# Patient Record
Sex: FEMALE | Race: WHITE | NOT HISPANIC OR LATINO | Employment: PART TIME | URBAN - METROPOLITAN AREA
[De-identification: names, ages, dates, MRNs, and addresses within clinical notes are randomized per-mention and may not be internally consistent; named-entity substitution may affect disease eponyms.]

---

## 2017-01-03 ENCOUNTER — APPOINTMENT (OUTPATIENT)
Dept: URGENT CARE | Age: 57
End: 2017-01-03
Payer: OTHER MISCELLANEOUS

## 2017-01-03 PROCEDURE — 99283 EMERGENCY DEPT VISIT LOW MDM: CPT | Performed by: FAMILY MEDICINE

## 2017-01-03 PROCEDURE — G0382 LEV 3 HOSP TYPE B ED VISIT: HCPCS | Performed by: FAMILY MEDICINE

## 2017-01-04 ENCOUNTER — OFFICE VISIT (OUTPATIENT)
Dept: PHYSICAL THERAPY | Age: 57
End: 2017-01-04
Payer: OTHER MISCELLANEOUS

## 2017-01-04 PROCEDURE — 97162 PT EVAL MOD COMPLEX 30 MIN: CPT

## 2017-01-06 ENCOUNTER — APPOINTMENT (OUTPATIENT)
Dept: URGENT CARE | Age: 57
End: 2017-01-06
Payer: OTHER MISCELLANEOUS

## 2017-01-06 PROCEDURE — 99213 OFFICE O/P EST LOW 20 MIN: CPT | Performed by: FAMILY MEDICINE

## 2017-01-09 ENCOUNTER — APPOINTMENT (OUTPATIENT)
Dept: PHYSICAL THERAPY | Age: 57
End: 2017-01-09
Payer: OTHER MISCELLANEOUS

## 2017-01-09 PROCEDURE — 97113 AQUATIC THERAPY/EXERCISES: CPT

## 2017-01-10 ENCOUNTER — APPOINTMENT (OUTPATIENT)
Dept: URGENT CARE | Age: 57
End: 2017-01-10
Payer: OTHER MISCELLANEOUS

## 2017-01-10 PROCEDURE — 99213 OFFICE O/P EST LOW 20 MIN: CPT | Performed by: FAMILY MEDICINE

## 2017-01-11 ENCOUNTER — APPOINTMENT (OUTPATIENT)
Dept: PHYSICAL THERAPY | Age: 57
End: 2017-01-11
Payer: OTHER MISCELLANEOUS

## 2017-01-11 PROCEDURE — 97113 AQUATIC THERAPY/EXERCISES: CPT

## 2017-01-16 ENCOUNTER — APPOINTMENT (OUTPATIENT)
Dept: PHYSICAL THERAPY | Age: 57
End: 2017-01-16
Payer: OTHER MISCELLANEOUS

## 2017-01-16 PROCEDURE — 97113 AQUATIC THERAPY/EXERCISES: CPT

## 2017-01-18 ENCOUNTER — APPOINTMENT (OUTPATIENT)
Dept: PHYSICAL THERAPY | Age: 57
End: 2017-01-18
Payer: OTHER MISCELLANEOUS

## 2017-01-18 PROCEDURE — 97113 AQUATIC THERAPY/EXERCISES: CPT

## 2017-01-19 ENCOUNTER — OFFICE VISIT (OUTPATIENT)
Dept: URGENT CARE | Age: 57
End: 2017-01-19
Payer: COMMERCIAL

## 2017-01-19 PROCEDURE — 99213 OFFICE O/P EST LOW 20 MIN: CPT

## 2017-01-23 ENCOUNTER — APPOINTMENT (OUTPATIENT)
Dept: PHYSICAL THERAPY | Age: 57
End: 2017-01-23
Payer: OTHER MISCELLANEOUS

## 2017-01-25 ENCOUNTER — APPOINTMENT (OUTPATIENT)
Dept: PHYSICAL THERAPY | Age: 57
End: 2017-01-25
Payer: OTHER MISCELLANEOUS

## 2017-01-30 ENCOUNTER — APPOINTMENT (OUTPATIENT)
Dept: PHYSICAL THERAPY | Age: 57
End: 2017-01-30
Payer: OTHER MISCELLANEOUS

## 2017-06-27 ENCOUNTER — APPOINTMENT (OUTPATIENT)
Dept: URGENT CARE | Age: 57
End: 2017-06-27
Payer: OTHER MISCELLANEOUS

## 2017-06-27 ENCOUNTER — APPOINTMENT (OUTPATIENT)
Dept: RADIOLOGY | Age: 57
End: 2017-06-27
Attending: PREVENTIVE MEDICINE
Payer: OTHER MISCELLANEOUS

## 2017-06-27 ENCOUNTER — TRANSCRIBE ORDERS (OUTPATIENT)
Dept: URGENT CARE | Age: 57
End: 2017-06-27

## 2017-06-27 DIAGNOSIS — T14.90XA INJURY: Primary | ICD-10-CM

## 2017-06-27 DIAGNOSIS — T14.90XA INJURY: ICD-10-CM

## 2017-06-27 PROCEDURE — 99283 EMERGENCY DEPT VISIT LOW MDM: CPT | Performed by: PREVENTIVE MEDICINE

## 2017-06-27 PROCEDURE — G0382 LEV 3 HOSP TYPE B ED VISIT: HCPCS | Performed by: PREVENTIVE MEDICINE

## 2017-06-27 PROCEDURE — 73564 X-RAY EXAM KNEE 4 OR MORE: CPT

## 2020-12-28 ENCOUNTER — OFFICE VISIT (OUTPATIENT)
Dept: URGENT CARE | Age: 60
End: 2020-12-28
Payer: COMMERCIAL

## 2020-12-28 VITALS
HEIGHT: 65 IN | BODY MASS INDEX: 40.82 KG/M2 | WEIGHT: 245 LBS | OXYGEN SATURATION: 95 % | TEMPERATURE: 97.2 F | RESPIRATION RATE: 18 BRPM | HEART RATE: 98 BPM

## 2020-12-28 DIAGNOSIS — R43.2 LOSS OF TASTE: Primary | ICD-10-CM

## 2020-12-28 PROCEDURE — G0382 LEV 3 HOSP TYPE B ED VISIT: HCPCS | Performed by: PHYSICIAN ASSISTANT

## 2020-12-28 PROCEDURE — S9083 URGENT CARE CENTER GLOBAL: HCPCS | Performed by: PHYSICIAN ASSISTANT

## 2020-12-28 PROCEDURE — U0003 INFECTIOUS AGENT DETECTION BY NUCLEIC ACID (DNA OR RNA); SEVERE ACUTE RESPIRATORY SYNDROME CORONAVIRUS 2 (SARS-COV-2) (CORONAVIRUS DISEASE [COVID-19]), AMPLIFIED PROBE TECHNIQUE, MAKING USE OF HIGH THROUGHPUT TECHNOLOGIES AS DESCRIBED BY CMS-2020-01-R: HCPCS | Performed by: PHYSICIAN ASSISTANT

## 2020-12-28 RX ORDER — AMLODIPINE BESYLATE 5 MG/1
TABLET ORAL
COMMUNITY
Start: 2020-12-04

## 2020-12-28 RX ORDER — TELMISARTAN 80 MG/1
80 TABLET ORAL DAILY
COMMUNITY
Start: 2020-10-26

## 2020-12-30 LAB — SARS-COV-2 RNA SPEC QL NAA+PROBE: NOT DETECTED

## 2021-03-06 ENCOUNTER — OFFICE VISIT (OUTPATIENT)
Dept: URGENT CARE | Age: 61
End: 2021-03-06
Payer: COMMERCIAL

## 2021-03-06 VITALS
TEMPERATURE: 98.6 F | WEIGHT: 240 LBS | DIASTOLIC BLOOD PRESSURE: 86 MMHG | SYSTOLIC BLOOD PRESSURE: 136 MMHG | BODY MASS INDEX: 39.99 KG/M2 | RESPIRATION RATE: 18 BRPM | OXYGEN SATURATION: 99 % | HEART RATE: 72 BPM | HEIGHT: 65 IN

## 2021-03-06 DIAGNOSIS — R21 RASH: Primary | ICD-10-CM

## 2021-03-06 PROCEDURE — 99213 OFFICE O/P EST LOW 20 MIN: CPT | Performed by: PHYSICIAN ASSISTANT

## 2021-03-06 RX ORDER — FAMOTIDINE 10 MG
10 TABLET ORAL 2 TIMES DAILY
COMMUNITY
Start: 2020-12-30 | End: 2021-12-30

## 2021-03-06 RX ORDER — PREDNISONE 20 MG/1
40 TABLET ORAL DAILY
Qty: 10 TABLET | Refills: 0 | Status: SHIPPED | OUTPATIENT
Start: 2021-03-06 | End: 2021-03-11

## 2021-03-06 NOTE — PATIENT INSTRUCTIONS
take prednisone as directed until completed   continue use Benadryl as needed for itching  Follow up with PCP in 3-5 days  Proceed to  ER if symptoms worsen  Acute Rash   AMBULATORY CARE:   A rash  is irritated, red, or itchy skin or mucus membranes, such as the lining of your nose or throat  Acute means the rash starts suddenly, worsens quickly, and lasts a short time  Common causes include a disease or infection, a reaction to something you are allergic to, or certain medicines  Seek care immediately if:   · You have sudden trouble breathing or chest pain  · You are vomiting, have a headache or muscle aches, and your throat hurts  Call your doctor or dermatologist if:   · You have a fever  · You get open wounds from scratching your skin, or you have a wound that is red, swollen, or painful  · Your rash lasts longer than 3 months  · You have swelling or pain in your joints  · You have questions or concerns about your condition or care  Common types of rashes:   · Eczema  causes inflamed, itchy areas  Your skin may be dry, scaly, and thick  The outer layer may be damaged  Irritants, stress, or a family history of eczema make you more likely to get it  · Contact dermatitis  causes a small, itchy growth that may be flat or raised  It appears after you touch something that damages your skin or causes an allergic reaction  Examples include chemicals, metals, dye, soaps or detergents, and latex  · Atopic dermatitis  causes small, itchy, blister-like growths along skin lines and folds  The growths may ooze fluid and become scaly, crusted, or hard  You may have sore, dry skin or swollen eyes  This rash usually forms after you are around an allergen, are overheated, or wear rough clothing  · Urticaria (hives)  appears suddenly as patches and raised areas of swollen skin or mucus membrane  The area may itch or burn   Common causes include allergens, latex, certain foods, a bee sting, smoke, or a blood transfusion  · Pityriasis rosea  may appear before you get a disease caused by bacteria or a virus  The rash may look like a patch on your chest, back, or abdomen  The rash may spread to become small, red, cone-shaped bumps that usually grow in groups  Treatment  will depend on the condition causing your acute rash  You may need any of the following:  · Medicines  may be used to decrease itching or inflammation, or prevent or treat a bacterial infection  Medicines may also help your immune system fight infection or stop it from attacking your skin  · Ultraviolet phototherapy  means the rash is put under light  Light therapy helps treats atopic dermatitis or eczema that does not get better with steroids  It can help pityriasis rosea heal faster and decrease itching  Prevent a rash or care for your skin when you have a rash:  Dry skin can lead to more problems  Do not scratch your skin if it itches  You may cause a skin infection by scratching  The following may prevent dry skin, and help your skin look better:  · Help soothe your rash  Apply thick cream lotions or petroleum jelly  Cool compresses may also soothe your skin  Apply a cool compress or a cool, wet towel, and then cover it with a dry towel  · Use lukewarm water when you bathe  Hot water may damage your skin more  Pat your skin dry  Do not rub your skin with a towel  · Use detergents, soaps, shampoos, and bubble baths  made for sensitive skin  · Wear clothes made of cotton instead of nylon or wool  Cotton is softer, so it will not hurt your skin as much  Follow up with your healthcare providers as directed:  A dermatologist may help find the cause of your rash or help plan or change treatment  A dietitian may help with meal planning if you have a food allergy  Write down your questions so you remember to ask them during your visits    © Copyright [employer] Information is for End User's use only and may not be sold, redistributed or otherwise used for commercial purposes  All illustrations and images included in CareNotes® are the copyrighted property of [employer]  or [employer]  The above information is an  only  It is not intended as medical advice for individual conditions or treatments  Talk to your doctor, nurse or pharmacist before following any medical regimen to see if it is safe and effective for you

## 2021-03-06 NOTE — PROGRESS NOTES
[facility]        NAME: Felicia Jones is a 62 y o  female  : 1960    MRN: [id number]  DATE: 2021  TIME: 5:47 PM    Assessment and Plan   Rash [R21]  1  Rash  predniSONE 20 mg tablet         Patient Instructions      take prednisone as directed until completed   continue use Benadryl as needed for itching  Follow up with PCP in 3-5 days  Proceed to  ER if symptoms worsen  Chief Complaint     Chief Complaint   Patient presents with    Rash     Patient c/o a rash all over her body that started on 2021  History of Present Illness        45-year-old female presents with rash  Patient reports it started on 2021  Has been waxing waning  Reports that is very itchy  No new soaps lotions or medications  Denies any shortness of breath chest pain or wheezing  No swelling of lips tongue or throat or mouth  Has been using some Benadryl with minimal relief    Rash  This is a new problem  The current episode started 1 to 4 weeks ago  The problem has been waxing and waning since onset  The rash is diffuse  The rash is characterized by itchiness, redness and swelling  She was exposed to nothing  Pertinent negatives include no cough, fatigue, fever, rhinorrhea, shortness of breath, sore throat or vomiting  Past treatments include antihistamine  The treatment provided mild relief  Review of Systems   Review of Systems   Constitutional: Negative  Negative for fatigue and fever  HENT: Negative  Negative for rhinorrhea and sore throat  Eyes: Negative  Respiratory: Negative  Negative for cough and shortness of breath  Cardiovascular: Negative  Gastrointestinal: Negative  Negative for vomiting  Musculoskeletal: Negative  Skin: Positive for rash  Neurological: Negative            Current Medications       Current Outpatient Medications:     famotidine (PEPCID) 10 mg tablet, Take 10 mg by mouth 2 (two) times a day, Disp: , Rfl:     amLODIPine (NORVASC) 5 mg tablet, , Disp: , Rfl:     predniSONE 20 mg tablet, Take 2 tablets (40 mg total) by mouth daily for 5 days, Disp: 10 tablet, Rfl: 0    telmisartan (MICARDIS) 80 MG tablet, Take 80 mg by mouth daily, Disp: , Rfl:     Current Allergies     Allergies as of 03/06/2021    (No Known Allergies)            The following portions of the patient's history were reviewed and updated as appropriate: allergies, current medications, past family history, past medical history, past social history, past surgical history and problem list      Past Medical History:   Diagnosis Date    Hypertension        Past Surgical History:   Procedure Laterality Date    DENTAL SURGERY  11/27/2020       History reviewed  No pertinent family history  Medications have been verified  Objective   /86   Pulse 72   Temp 98 6 °F (37 °C)   Resp 18   Ht 5' 5" (1 651 m)   Wt 109 kg (240 lb)   SpO2 99%   BMI 39 94 kg/m²   No LMP recorded  Patient is postmenopausal        Physical Exam     Physical Exam  Vitals signs and nursing note reviewed  Constitutional:       General: She is not in acute distress  Appearance: She is well-developed  HENT:      Head: Normocephalic and atraumatic  Right Ear: Hearing, tympanic membrane, ear canal and external ear normal       Left Ear: Hearing, tympanic membrane, ear canal and external ear normal       Nose: Nose normal       Mouth/Throat:      Pharynx: Uvula midline  No oropharyngeal exudate  Eyes:      General:         Right eye: No discharge  Left eye: No discharge  Conjunctiva/sclera: Conjunctivae normal    Neck:      Musculoskeletal: Normal range of motion and neck supple  Cardiovascular:      Rate and Rhythm: Normal rate and regular rhythm  Heart sounds: Normal heart sounds  No murmur  Pulmonary:      Effort: Pulmonary effort is normal  No respiratory distress  Breath sounds: Normal breath sounds  No wheezing or rales     Abdominal:      General: Bowel sounds are normal       Palpations: Abdomen is soft  Tenderness: There is no abdominal tenderness  Musculoskeletal: Normal range of motion  Lymphadenopathy:      Cervical: No cervical adenopathy  Skin:     General: Skin is warm and dry  Findings: Rash ( diffuse) present  Rash is urticarial    Neurological:      Mental Status: She is alert and oriented to person, place, and time

## 2021-03-10 DIAGNOSIS — Z23 ENCOUNTER FOR IMMUNIZATION: ICD-10-CM

## 2021-03-15 DIAGNOSIS — R21 RASH: ICD-10-CM

## 2021-03-15 RX ORDER — PREDNISONE 20 MG/1
TABLET ORAL
Qty: 10 TABLET | Refills: 0 | OUTPATIENT
Start: 2021-03-15

## 2022-01-15 ENCOUNTER — NURSE TRIAGE (OUTPATIENT)
Dept: OTHER | Facility: OTHER | Age: 62
End: 2022-01-15

## 2022-01-15 DIAGNOSIS — Z20.828 SARS-ASSOCIATED CORONAVIRUS EXPOSURE: Primary | ICD-10-CM

## 2022-01-15 NOTE — TELEPHONE ENCOUNTER
Regarding: Covid - excess mucus in nose and chest, come into her throat every so often   ----- Message from Rima Nicholas MA sent at 1/15/2022  6:13 PM EST -----  "I am having a lot of mucus in my chest and nose  Every so often it comes up in my throat   I did have a fever yesterday but it is gone  "

## 2022-01-15 NOTE — TELEPHONE ENCOUNTER
Reason for Disposition   [1] COVID-19 infection suspected by caller or triager AND [2] mild symptoms (cough, fever, or others) AND [3] has not gotten tested yet    Answer Assessment - Initial Assessment Questions  Were you within 6 feet or less, for up to 15 minutes or more with a person that has a confirmed COVID-19 test? Yes, sister     What was the date of your exposure?     01/01/2022 symptoms started 01/05/2022    Are you experiencing any symptoms attributed to the virus? (Assess for SOB, cough, fever, difficulty breathing) rash (hives), congestion,fever,cough      HIGH RISK: Do you have any history heart or lung conditions, weakened immune system, diabetes, Asthma, CHF, HIV, COPD, Chemo, renal failure, sickle cell, etc? Denies     PREGNANCY: Are you pregnant or did you recently give birth? n/a    VACCINE: "Have you gotten the COVID-19 vaccine?" If Yes ask: "Which one, how many shots, when did you get it?"     2 doses of moderna 11/2021    Protocols used: CORONAVIRUS (COVID-19) [facility]